# Patient Record
Sex: FEMALE | Race: WHITE | NOT HISPANIC OR LATINO | URBAN - METROPOLITAN AREA
[De-identification: names, ages, dates, MRNs, and addresses within clinical notes are randomized per-mention and may not be internally consistent; named-entity substitution may affect disease eponyms.]

---

## 2018-10-25 ENCOUNTER — INPATIENT (INPATIENT)
Facility: HOSPITAL | Age: 23
LOS: 0 days | Discharge: HOME | DRG: 761 | End: 2018-10-25
Attending: OBSTETRICS & GYNECOLOGY | Admitting: OBSTETRICS & GYNECOLOGY
Payer: COMMERCIAL

## 2018-10-25 ENCOUNTER — TRANSCRIPTION ENCOUNTER (OUTPATIENT)
Age: 23
End: 2018-10-25

## 2018-10-25 VITALS
TEMPERATURE: 99 F | HEIGHT: 64 IN | DIASTOLIC BLOOD PRESSURE: 82 MMHG | HEART RATE: 101 BPM | OXYGEN SATURATION: 97 % | SYSTOLIC BLOOD PRESSURE: 129 MMHG | RESPIRATION RATE: 18 BRPM | WEIGHT: 119.93 LBS

## 2018-10-25 VITALS
DIASTOLIC BLOOD PRESSURE: 61 MMHG | SYSTOLIC BLOOD PRESSURE: 123 MMHG | RESPIRATION RATE: 18 BRPM | HEART RATE: 783 BPM | OXYGEN SATURATION: 100 % | TEMPERATURE: 98 F

## 2018-10-25 DIAGNOSIS — N83.209 UNSPECIFIED OVARIAN CYST, UNSPECIFIED SIDE: ICD-10-CM

## 2018-10-25 LAB
ALBUMIN SERPL ELPH-MCNC: 4.5 G/DL — SIGNIFICANT CHANGE UP (ref 3.5–5)
ALP SERPL-CCNC: 82 U/L — SIGNIFICANT CHANGE UP (ref 40–120)
ALT FLD-CCNC: 58 U/L DA — SIGNIFICANT CHANGE UP (ref 10–60)
ANION GAP SERPL CALC-SCNC: 7 MMOL/L — SIGNIFICANT CHANGE UP (ref 5–17)
APPEARANCE UR: CLEAR — SIGNIFICANT CHANGE UP
APTT BLD: 31.5 SEC — SIGNIFICANT CHANGE UP (ref 27.5–37.4)
AST SERPL-CCNC: 30 U/L — SIGNIFICANT CHANGE UP (ref 10–40)
BASOPHILS # BLD AUTO: 0.1 K/UL — SIGNIFICANT CHANGE UP (ref 0–0.2)
BASOPHILS NFR BLD AUTO: 0.5 % — SIGNIFICANT CHANGE UP (ref 0–2)
BASOPHILS NFR BLD AUTO: 0.6 % — SIGNIFICANT CHANGE UP (ref 0–2)
BASOPHILS NFR BLD AUTO: 0.7 % — SIGNIFICANT CHANGE UP (ref 0–2)
BILIRUB SERPL-MCNC: 0.4 MG/DL — SIGNIFICANT CHANGE UP (ref 0.2–1.2)
BILIRUB UR-MCNC: NEGATIVE — SIGNIFICANT CHANGE UP
BUN SERPL-MCNC: 16 MG/DL — SIGNIFICANT CHANGE UP (ref 7–18)
CALCIUM SERPL-MCNC: 9.7 MG/DL — SIGNIFICANT CHANGE UP (ref 8.4–10.5)
CHLORIDE SERPL-SCNC: 106 MMOL/L — SIGNIFICANT CHANGE UP (ref 96–108)
CO2 SERPL-SCNC: 26 MMOL/L — SIGNIFICANT CHANGE UP (ref 22–31)
COLOR SPEC: YELLOW — SIGNIFICANT CHANGE UP
CREAT SERPL-MCNC: 0.85 MG/DL — SIGNIFICANT CHANGE UP (ref 0.5–1.3)
DIFF PNL FLD: ABNORMAL
EOSINOPHIL # BLD AUTO: 0 K/UL — SIGNIFICANT CHANGE UP (ref 0–0.5)
EOSINOPHIL # BLD AUTO: 0 K/UL — SIGNIFICANT CHANGE UP (ref 0–0.5)
EOSINOPHIL # BLD AUTO: 0.1 K/UL — SIGNIFICANT CHANGE UP (ref 0–0.5)
EOSINOPHIL NFR BLD AUTO: 0.2 % — SIGNIFICANT CHANGE UP (ref 0–6)
EOSINOPHIL NFR BLD AUTO: 0.4 % — SIGNIFICANT CHANGE UP (ref 0–6)
EOSINOPHIL NFR BLD AUTO: 0.5 % — SIGNIFICANT CHANGE UP (ref 0–6)
GLUCOSE SERPL-MCNC: 88 MG/DL — SIGNIFICANT CHANGE UP (ref 70–99)
GLUCOSE UR QL: NEGATIVE — SIGNIFICANT CHANGE UP
HCG SERPL-ACNC: <1 MIU/ML — SIGNIFICANT CHANGE UP
HCG UR QL: NEGATIVE — SIGNIFICANT CHANGE UP
HCT VFR BLD CALC: 28.8 % — LOW (ref 34.5–45)
HCT VFR BLD CALC: 28.9 % — LOW (ref 34.5–45)
HCT VFR BLD CALC: 30.4 % — LOW (ref 34.5–45)
HCT VFR BLD CALC: 37 % — SIGNIFICANT CHANGE UP (ref 34.5–45)
HGB BLD-MCNC: 10.1 G/DL — LOW (ref 11.5–15.5)
HGB BLD-MCNC: 12.3 G/DL — SIGNIFICANT CHANGE UP (ref 11.5–15.5)
HGB BLD-MCNC: 9.4 G/DL — LOW (ref 11.5–15.5)
HGB BLD-MCNC: 9.5 G/DL — LOW (ref 11.5–15.5)
INR BLD: 1.13 RATIO — SIGNIFICANT CHANGE UP (ref 0.88–1.16)
KETONES UR-MCNC: ABNORMAL
LEUKOCYTE ESTERASE UR-ACNC: ABNORMAL
LIDOCAIN IGE QN: 286 U/L — SIGNIFICANT CHANGE UP (ref 73–393)
LYMPHOCYTES # BLD AUTO: 2.5 K/UL — SIGNIFICANT CHANGE UP (ref 1–3.3)
LYMPHOCYTES # BLD AUTO: 2.8 K/UL — SIGNIFICANT CHANGE UP (ref 1–3.3)
LYMPHOCYTES # BLD AUTO: 24.6 % — SIGNIFICANT CHANGE UP (ref 13–44)
LYMPHOCYTES # BLD AUTO: 28 % — SIGNIFICANT CHANGE UP (ref 13–44)
LYMPHOCYTES # BLD AUTO: 28.8 % — SIGNIFICANT CHANGE UP (ref 13–44)
LYMPHOCYTES # BLD AUTO: 3.1 K/UL — SIGNIFICANT CHANGE UP (ref 1–3.3)
MCHC RBC-ENTMCNC: 27.6 PG — SIGNIFICANT CHANGE UP (ref 27–34)
MCHC RBC-ENTMCNC: 28 PG — SIGNIFICANT CHANGE UP (ref 27–34)
MCHC RBC-ENTMCNC: 28 PG — SIGNIFICANT CHANGE UP (ref 27–34)
MCHC RBC-ENTMCNC: 28.2 PG — SIGNIFICANT CHANGE UP (ref 27–34)
MCHC RBC-ENTMCNC: 32.6 GM/DL — SIGNIFICANT CHANGE UP (ref 32–36)
MCHC RBC-ENTMCNC: 33.1 GM/DL — SIGNIFICANT CHANGE UP (ref 32–36)
MCHC RBC-ENTMCNC: 33.2 GM/DL — SIGNIFICANT CHANGE UP (ref 32–36)
MCHC RBC-ENTMCNC: 33.3 GM/DL — SIGNIFICANT CHANGE UP (ref 32–36)
MCV RBC AUTO: 84.4 FL — SIGNIFICANT CHANGE UP (ref 80–100)
MCV RBC AUTO: 84.6 FL — SIGNIFICANT CHANGE UP (ref 80–100)
MCV RBC AUTO: 84.6 FL — SIGNIFICANT CHANGE UP (ref 80–100)
MCV RBC AUTO: 84.9 FL — SIGNIFICANT CHANGE UP (ref 80–100)
MONOCYTES # BLD AUTO: 0.7 K/UL — SIGNIFICANT CHANGE UP (ref 0–0.9)
MONOCYTES # BLD AUTO: 0.8 K/UL — SIGNIFICANT CHANGE UP (ref 0–0.9)
MONOCYTES # BLD AUTO: 1 K/UL — HIGH (ref 0–0.9)
MONOCYTES NFR BLD AUTO: 7.5 % — SIGNIFICANT CHANGE UP (ref 2–14)
MONOCYTES NFR BLD AUTO: 7.9 % — SIGNIFICANT CHANGE UP (ref 2–14)
MONOCYTES NFR BLD AUTO: 7.9 % — SIGNIFICANT CHANGE UP (ref 2–14)
NEUTROPHILS # BLD AUTO: 5.5 K/UL — SIGNIFICANT CHANGE UP (ref 1.8–7.4)
NEUTROPHILS # BLD AUTO: 6.2 K/UL — SIGNIFICANT CHANGE UP (ref 1.8–7.4)
NEUTROPHILS # BLD AUTO: 8.5 K/UL — HIGH (ref 1.8–7.4)
NEUTROPHILS NFR BLD AUTO: 62 % — SIGNIFICANT CHANGE UP (ref 43–77)
NEUTROPHILS NFR BLD AUTO: 63.4 % — SIGNIFICANT CHANGE UP (ref 43–77)
NEUTROPHILS NFR BLD AUTO: 66.8 % — SIGNIFICANT CHANGE UP (ref 43–77)
NITRITE UR-MCNC: NEGATIVE — SIGNIFICANT CHANGE UP
PH UR: 5 — SIGNIFICANT CHANGE UP (ref 5–8)
PLATELET # BLD AUTO: 197 K/UL — SIGNIFICANT CHANGE UP (ref 150–400)
PLATELET # BLD AUTO: 216 K/UL — SIGNIFICANT CHANGE UP (ref 150–400)
PLATELET # BLD AUTO: 224 K/UL — SIGNIFICANT CHANGE UP (ref 150–400)
PLATELET # BLD AUTO: 275 K/UL — SIGNIFICANT CHANGE UP (ref 150–400)
POTASSIUM SERPL-MCNC: 3.6 MMOL/L — SIGNIFICANT CHANGE UP (ref 3.5–5.3)
POTASSIUM SERPL-SCNC: 3.6 MMOL/L — SIGNIFICANT CHANGE UP (ref 3.5–5.3)
PROT SERPL-MCNC: 8.2 G/DL — SIGNIFICANT CHANGE UP (ref 6–8.3)
PROT UR-MCNC: NEGATIVE — SIGNIFICANT CHANGE UP
PROTHROM AB SERPL-ACNC: 12.4 SEC — SIGNIFICANT CHANGE UP (ref 9.8–12.7)
RBC # BLD: 3.4 M/UL — LOW (ref 3.8–5.2)
RBC # BLD: 3.41 M/UL — LOW (ref 3.8–5.2)
RBC # BLD: 3.57 M/UL — LOW (ref 3.8–5.2)
RBC # BLD: 4.39 M/UL — SIGNIFICANT CHANGE UP (ref 3.8–5.2)
RBC # FLD: 11.3 % — SIGNIFICANT CHANGE UP (ref 10.3–14.5)
RBC # FLD: 11.5 % — SIGNIFICANT CHANGE UP (ref 10.3–14.5)
SODIUM SERPL-SCNC: 139 MMOL/L — SIGNIFICANT CHANGE UP (ref 135–145)
SP GR SPEC: 1.02 — SIGNIFICANT CHANGE UP (ref 1.01–1.02)
UROBILINOGEN FLD QL: NEGATIVE — SIGNIFICANT CHANGE UP
WBC # BLD: 12.6 K/UL — HIGH (ref 3.8–10.5)
WBC # BLD: 14.1 K/UL — HIGH (ref 3.8–10.5)
WBC # BLD: 8.8 K/UL — SIGNIFICANT CHANGE UP (ref 3.8–10.5)
WBC # BLD: 9.6 K/UL — SIGNIFICANT CHANGE UP (ref 3.8–10.5)
WBC # FLD AUTO: 12.6 K/UL — HIGH (ref 3.8–10.5)
WBC # FLD AUTO: 14.1 K/UL — HIGH (ref 3.8–10.5)
WBC # FLD AUTO: 8.8 K/UL — SIGNIFICANT CHANGE UP (ref 3.8–10.5)
WBC # FLD AUTO: 9.6 K/UL — SIGNIFICANT CHANGE UP (ref 3.8–10.5)

## 2018-10-25 PROCEDURE — 86900 BLOOD TYPING SEROLOGIC ABO: CPT

## 2018-10-25 PROCEDURE — 85027 COMPLETE CBC AUTOMATED: CPT

## 2018-10-25 PROCEDURE — 80053 COMPREHEN METABOLIC PANEL: CPT

## 2018-10-25 PROCEDURE — 83690 ASSAY OF LIPASE: CPT

## 2018-10-25 PROCEDURE — 76856 US EXAM PELVIC COMPLETE: CPT

## 2018-10-25 PROCEDURE — 76830 TRANSVAGINAL US NON-OB: CPT | Mod: 26

## 2018-10-25 PROCEDURE — 85610 PROTHROMBIN TIME: CPT

## 2018-10-25 PROCEDURE — 84702 CHORIONIC GONADOTROPIN TEST: CPT

## 2018-10-25 PROCEDURE — 85730 THROMBOPLASTIN TIME PARTIAL: CPT

## 2018-10-25 PROCEDURE — 86850 RBC ANTIBODY SCREEN: CPT

## 2018-10-25 PROCEDURE — 86901 BLOOD TYPING SEROLOGIC RH(D): CPT

## 2018-10-25 PROCEDURE — 76856 US EXAM PELVIC COMPLETE: CPT | Mod: 26

## 2018-10-25 PROCEDURE — 81025 URINE PREGNANCY TEST: CPT

## 2018-10-25 PROCEDURE — 76830 TRANSVAGINAL US NON-OB: CPT

## 2018-10-25 PROCEDURE — 96375 TX/PRO/DX INJ NEW DRUG ADDON: CPT

## 2018-10-25 PROCEDURE — 99285 EMERGENCY DEPT VISIT HI MDM: CPT | Mod: 25

## 2018-10-25 PROCEDURE — 96374 THER/PROPH/DIAG INJ IV PUSH: CPT

## 2018-10-25 PROCEDURE — 81001 URINALYSIS AUTO W/SCOPE: CPT

## 2018-10-25 PROCEDURE — 87086 URINE CULTURE/COLONY COUNT: CPT

## 2018-10-25 RX ORDER — KETOROLAC TROMETHAMINE 30 MG/ML
30 SYRINGE (ML) INJECTION ONCE
Qty: 0 | Refills: 0 | Status: DISCONTINUED | OUTPATIENT
Start: 2018-10-25 | End: 2018-10-25

## 2018-10-25 RX ORDER — KETOROLAC TROMETHAMINE 30 MG/ML
15 SYRINGE (ML) INJECTION EVERY 6 HOURS
Qty: 0 | Refills: 0 | Status: DISCONTINUED | OUTPATIENT
Start: 2018-10-25 | End: 2018-10-25

## 2018-10-25 RX ORDER — ACETAMINOPHEN 500 MG
1 TABLET ORAL
Qty: 20 | Refills: 0 | OUTPATIENT
Start: 2018-10-25 | End: 2018-10-29

## 2018-10-25 RX ORDER — SODIUM CHLORIDE 9 MG/ML
1000 INJECTION, SOLUTION INTRAVENOUS
Qty: 0 | Refills: 0 | Status: DISCONTINUED | OUTPATIENT
Start: 2018-10-25 | End: 2018-10-25

## 2018-10-25 RX ORDER — FERROUS SULFATE 325(65) MG
1 TABLET ORAL
Qty: 30 | Refills: 0 | OUTPATIENT
Start: 2018-10-25 | End: 2018-11-23

## 2018-10-25 RX ORDER — ONDANSETRON 8 MG/1
4 TABLET, FILM COATED ORAL ONCE
Qty: 0 | Refills: 0 | Status: COMPLETED | OUTPATIENT
Start: 2018-10-25 | End: 2018-10-25

## 2018-10-25 RX ORDER — SODIUM CHLORIDE 9 MG/ML
1000 INJECTION INTRAMUSCULAR; INTRAVENOUS; SUBCUTANEOUS ONCE
Qty: 0 | Refills: 0 | Status: COMPLETED | OUTPATIENT
Start: 2018-10-25 | End: 2018-10-25

## 2018-10-25 RX ADMIN — Medication 30 MILLIGRAM(S): at 03:17

## 2018-10-25 RX ADMIN — SODIUM CHLORIDE 1000 MILLILITER(S): 9 INJECTION INTRAMUSCULAR; INTRAVENOUS; SUBCUTANEOUS at 03:17

## 2018-10-25 RX ADMIN — ONDANSETRON 4 MILLIGRAM(S): 8 TABLET, FILM COATED ORAL at 02:53

## 2018-10-25 RX ADMIN — SODIUM CHLORIDE 1000 MILLILITER(S): 9 INJECTION INTRAMUSCULAR; INTRAVENOUS; SUBCUTANEOUS at 02:53

## 2018-10-25 RX ADMIN — Medication 30 MILLIGRAM(S): at 04:17

## 2018-10-25 RX ADMIN — SODIUM CHLORIDE 1000 MILLILITER(S): 9 INJECTION INTRAMUSCULAR; INTRAVENOUS; SUBCUTANEOUS at 06:42

## 2018-10-25 NOTE — ED PROVIDER NOTE - PROGRESS NOTE DETAILS
repeat hgb dropped 2 points. obgyn consulted- pt to be admitted to obgyn. tachycardia resolved, but BP now low normal. additional 1L NS ordered.

## 2018-10-25 NOTE — DISCHARGE NOTE ADULT - MEDICATION SUMMARY - MEDICATIONS TO TAKE
I will START or STAY ON the medications listed below when I get home from the hospital:    acetaminophen 325 mg oral tablet  -- 1 tab(s) by mouth every 6 hours, As Needed   -- This product contains acetaminophen.  Do not use  with any other product containing acetaminophen to prevent possible liver damage.    -- Indication: For pain I will START or STAY ON the medications listed below when I get home from the hospital:    acetaminophen 325 mg oral tablet  -- 1 tab(s) by mouth every 6 hours, As Needed   -- This product contains acetaminophen.  Do not use  with any other product containing acetaminophen to prevent possible liver damage.    -- Indication: For pain     ferrous sulfate 325 mg (65 mg elemental iron) oral tablet  -- 1 tab(s) by mouth once a day   -- Check with your doctor before becoming pregnant.  Do not chew, break, or crush.  May discolor urine or feces.    -- Indication: For anemia due to blood loss

## 2018-10-25 NOTE — H&P ADULT - HISTORY OF PRESENT ILLNESS
Patiens is a 22y/o F  (LMP ) who presents to ER complaining of sudden onset pelvic pain after intercourse last night around midnight. States that the pain was sharp and sudden and could barely walk. Denies any n/v/d, fever, chills, loss of appetite, weakness, dizziness, sob, chest pain or any other concerns.  Denies any ob/gyn hx, has never seen a gynecologist

## 2018-10-25 NOTE — H&P ADULT - PROBLEM SELECTOR PLAN 1
At this time, per Dr. Gregory, will manage conservatively, will repeat serial cbcs every 4 hours, to ensure patient is stable  monitor for 24 hours since first cbc  patient to be referred to the [facility] upon discharge   keep patient NPO for possible need for OR

## 2018-10-25 NOTE — PROGRESS NOTE ADULT - ASSESSMENT
A/P:  22yo F admitted to GYN with LT ruptured hemorrhagic cyst. Hemodynamically stable. Hgb from 12 to 9.5 over 7hours with IV hydration.

## 2018-10-25 NOTE — ED ADULT NURSE NOTE - ED STAT RN HANDOFF DETAILS 2
Received patient from [facility] A&OX3 admitted to surgery denies any pain or discomfort at present time. Patient assigned bed report to be given.

## 2018-10-25 NOTE — ED PROVIDER NOTE - MEDICAL DECISION MAKING DETAILS
22 y/o F pt presents with RLQ and suprapubic tenderness to palpation with mild tenderness to palpation to the LLQ. Will order labs, US pelvis, pain meds, UA and reassess. 24 y/o F pt presents with RLQ and suprapubic tenderness to palpation with mild tenderness to palpation to the LLQ. Will order labs, US pelvis, pain meds, UA and reassess.  labs initially unremarkable. upreg negative. US shows +FF and left ovarian cyst. rpt labs with drop In hgb. obgyn consulted- will admit.

## 2018-10-25 NOTE — PROGRESS NOTE ADULT - PROBLEM SELECTOR PLAN 1
- Continue close monitoring of vital signs  - Repeat CBC in 4 hours (1pm)  - Pain management PRN.  - Continue IV hydration  - Keep NPO   - Will reassess this PM  - Patient seen and examined with louie Walsh

## 2018-10-25 NOTE — DISCHARGE NOTE ADULT - PATIENT PORTAL LINK FT
You can access the via680[facility] Patient Portal, offered by [facility], by registering with the following website: [email or web address]

## 2018-10-25 NOTE — DISCHARGE NOTE ADULT - HOSPITAL COURSE
patient presented to ED with abdominal pain  Found to heave a hemorrhagic cyst on sono  H/H droped from 12 to 10 and patient was admitted  Repeat hgb was stable, patient clinically improved  Deemed hemodynamically stable by Dr. Rangel for discharge home

## 2018-10-25 NOTE — DISCHARGE NOTE ADULT - CARE PLAN
Principal Discharge DX:	Ruptured ovarian cyst  Goal:	pain management  Assessment and plan of treatment:	Take Tylenol as needed for pain  You should follow up with Dr. Rangel in [facility] on a WEDNESDAY in 2 weeks. Please call to make an appointment (number provided)  Take Iron once per day for the next 2 weeks to help replenish your iron stores  If you develop any worsening pain, dizziness, lightheadedness, chest pain, SOB, or fainting, please return to the ER immediately for medical attention. Principal Discharge DX:	Ruptured ovarian cyst  Goal:	pain management  Assessment and plan of treatment:	Take Tylenol as needed for pain  Take Iron once per day for the next 30 days for anemia due to blood loss.   You should follow up with Dr. Rangel in [facility] on a WEDNESDAY in 2 weeks. Please call to make an appointment (number provided)  If you develop any worsening pain, dizziness, lightheadedness, chest pain, SOB, or fainting, please return to the ER immediately for medical attention.

## 2018-10-25 NOTE — DISCHARGE NOTE ADULT - CARE PROVIDER_API CALL
Ximena Rangel), Obstetrics and Gynecology  [address] [place] [address]  Phone: [phone]  Fax: [phone]

## 2018-10-25 NOTE — H&P ADULT - NSHPPHYSICALEXAM_GEN_ALL_CORE
Gen: NAD, A&Ox3   Abd: Right lower quadrant more tender than the left lower quadrant, +rebound, no guarding;  gyn: no vaginal bleeding, no discharge

## 2018-10-25 NOTE — PROGRESS NOTE ADULT - SUBJECTIVE AND OBJECTIVE BOX
[facility], states her pain is much improved from earlier. Has received no pain medication while on the floor.     Vital Signs Last 24 Hrs  T(C): 36.7 (25 Oct 2018 09:05), Max: 37.1 (25 Oct 2018 01:22)  T(F): 98 (25 Oct 2018 09:05), Max: 98.8 (25 Oct 2018 01:22)  HR: 78 (25 Oct 2018 09:05) (78 - 101)  BP: 100/50 (25 Oct 2018 09:05) (95/53 - 129/82)  BP(mean): --  RR: 18 (25 Oct 2018 09:05) (16 - 18)  SpO2: 100% (25 Oct 2018 09:05) (97% - 100%)    Gen: A&O x 3, NAD  Abdomen: very mild discomfort to deep palpation through abdomen; soft, ND, no guarding or rebound; patient able to sit up in bed on her own without discomfort; no peritoneal signs  Gyn: no vaginal bleeding   Extremities: Nontender, no worsening edema                          9.5    x     )-----------( 216      ( 25 Oct 2018 10:31 )             28.8     10-25    139  |  106  |  16  ----------------------------<  88  3.6   |  26  |  0.85    Ca    9.7      25 Oct 2018 02:54    TPro  8.2  /  Alb  4.5  /  TBili  0.4  /  DBili  x   /  AST  30  /  ALT  58  /  AlkPhos  82  10-25      A/P:  22yo F admitted to GYN with LT ruptured hemorrhagic cyst. Hemodynamically stable. Hgb from 12 to 9.5 over 7hours with IV hydration.  - Continue close monitoring of vital signs  - Repeat CBC in 4 hours (1pm)  - Pain management PRN.  - Continue IV hydration  - Keep NPO   - Will reassess this PM  - Patient seen and examined with louie Walsh attending

## 2018-10-25 NOTE — H&P ADULT - NSHPLABSRESULTS_GEN_ALL_CORE
10.1   14.1  )-----------( 224      ( 25 Oct 2018 05:18 )             30.4     10-25    139  |  106  |  16  ----------------------------<  88  3.6   |  26  |  0.85    Ca    9.7      25 Oct 2018 02:54    TPro  8.2  /  Alb  4.5  /  TBili  0.4  /  DBili  x   /  AST  30  /  ALT  58  /  AlkPhos  82  10-25    Type + Screen (10.25.18 @ 03:09)    ABO RH Interpretation: B NEG: 10/25/2018 4:07  JRACOL  Attention: Second specimen is required for ABORH Confirmation.    < from: US Transvaginal (10.25.18 @ 03:45) >      EXAM:  US PELVIC COMPLETE                          EXAM:  US TRANSVAGINAL                            PROCEDURE DATE:  10/25/2018          INTERPRETATION:  10/25/2018 3:46 AM    CLINICAL INFORMATION: Right-sided pelvic pain    LMP: 9/27/2018, hCG less than 1    COMPARISON: None available.    TECHNIQUE: Transabdominal and Endovaginal pelvic sonogram. Color and   Spectral Doppler was performed.    FINDINGS:    Uterus: 10.4 x 3.8 x 5.4 cm.   Endometrium: 4 mm.   Right ovary: 3.5 x 2.0 x 2.0 cm. Flow is preserved  Left ovary: 3.6 x 2.7 x 3.8 cm. Complex cyst measuring 3.0 x 2.9 x 2.2   cm. Flow is preserved  Free fluid: Mild complex free fluid along the pelvis. Small amount of   fluid extends to the right upper quadrant along Germain's pouch.    IMPRESSION:    Complex cyst along the left ovary likely represents a hemorrhagic cyst.   Flow is preserved.    There is mild complex fluid within the pelvis and right upper quadrant   which likely indicates cyst rupture and resulting mild hemoperitoneum.    < end of copied text >

## 2018-10-25 NOTE — ED ADULT NURSE NOTE - OBJECTIVE STATEMENT
Pt c/o of lower abdominal 6/10, says it started last night, c/o of a little bit of nausea but no diarrhea.  Pt is A&O x3, able to make needs known, ambulates on own. Family at bedside.

## 2018-10-25 NOTE — CHART NOTE - NSCHARTNOTEFT_GEN_A_CORE
Repeat H/H 9.4/28.9, stable from previous CBC  Patient reassessed   States she is feeling much better   Asking when she can be discharged home  Patient deemed hemodynamically stable for discharge by Dr. Rangel, [place] attending   Tylenol PRN for pain  Please follow up in [facility] with Dr. Rangel on a Wednesday, in 2 weeks  Patient understands and agrees with plan   D/w Dr. Rangel

## 2018-10-25 NOTE — DISCHARGE NOTE ADULT - PLAN OF CARE
pain management Take Tylenol as needed for pain  You should follow up with Dr. Rangel in [facility] on a WEDNESDAY in 2 weeks. Please call to make an appointment (number provided)  Take Iron once per day for the next 2 weeks to help replenish your iron stores  If you develop any worsening pain, dizziness, lightheadedness, chest pain, SOB, or fainting, please return to the ER immediately for medical attention. Take Tylenol as needed for pain  Take Iron once per day for the next 30 days for anemia due to blood loss.   You should follow up with Dr. Rangel in [facility] on a WEDNESDAY in 2 weeks. Please call to make an appointment (number provided)  If you develop any worsening pain, dizziness, lightheadedness, chest pain, SOB, or fainting, please return to the ER immediately for medical attention.

## 2018-10-25 NOTE — ED ADULT NURSE REASSESSMENT NOTE - NS ED NURSE REASSESS COMMENT FT1
Pt reassessed, observed laying in bed, breathing room air, in no respiratory distress at time of assessment. All meds administered as ordered, tolerated well. Admitted to OBGYN, awaiting bed, nursing monitoring continues.

## 2018-10-25 NOTE — ED PROVIDER NOTE - OBJECTIVE STATEMENT
24 y/o F pt with no significant PMHx and no significant PSHx presents to the ED c/o sudden onset of lower abd/suprapubic pain with lower back pain while sitting x 30 minutes PTA to the ED. Pt also reports 1 episode of vomiting PTA to the ED with current nausea. Pt notes she has never has pain like this before and has not taken any medications PTA to the ED. Pt denies fever, chills, burning urination, hematuria, vaginal bleeding, diarrhea, Hx of abd surgeries, Hx of ovarian cysts or fibroids or any other complaints. NKDA. LNMP: 1 month ago.

## 2018-10-26 LAB
CULTURE RESULTS: NO GROWTH — SIGNIFICANT CHANGE UP
SPECIMEN SOURCE: SIGNIFICANT CHANGE UP

## 2018-11-16 ENCOUNTER — APPOINTMENT (OUTPATIENT)
Age: 23
End: 2018-11-16
Payer: COMMERCIAL

## 2018-11-16 VITALS
SYSTOLIC BLOOD PRESSURE: 110 MMHG | BODY MASS INDEX: 21.51 KG/M2 | HEART RATE: 83 BPM | TEMPERATURE: 98 F | OXYGEN SATURATION: 98 % | WEIGHT: 126 LBS | DIASTOLIC BLOOD PRESSURE: 60 MMHG | HEIGHT: 64 IN

## 2018-11-16 DIAGNOSIS — N83.202 UNSPECIFIED OVARIAN CYST, LEFT SIDE: ICD-10-CM

## 2018-11-16 DIAGNOSIS — Z87.440 PERSONAL HISTORY OF URINARY (TRACT) INFECTIONS: ICD-10-CM

## 2018-11-16 DIAGNOSIS — Z01.419 ENCOUNTER FOR GYNECOLOGICAL EXAMINATION (GENERAL) (ROUTINE) W/OUT ABNORMAL FINDINGS: ICD-10-CM

## 2018-11-16 DIAGNOSIS — Z30.011 ENCOUNTER FOR INITIAL PRESCRIPTION OF CONTRACEPTIVE PILLS: ICD-10-CM

## 2018-11-16 DIAGNOSIS — Z30.09 ENCOUNTER FOR OTHER GENERAL COUNSELING AND ADVICE ON CONTRACEPTION: ICD-10-CM

## 2018-11-16 DIAGNOSIS — Z11.3 ENCOUNTER FOR SCREENING FOR INFECTIONS WITH A PREDOMINANTLY SEXUAL MODE OF TRANSMISSION: ICD-10-CM

## 2018-11-16 DIAGNOSIS — N83.209 UNSPECIFIED OVARIAN CYST, UNSPECIFIED SIDE: ICD-10-CM

## 2018-11-16 PROBLEM — Z00.00 ENCOUNTER FOR PREVENTIVE HEALTH EXAMINATION: Status: ACTIVE | Noted: 2018-11-16

## 2018-11-16 PROCEDURE — 99385 PREV VISIT NEW AGE 18-39: CPT

## 2018-11-19 LAB
C TRACH RRNA SPEC QL NAA+PROBE: NOT DETECTED
N GONORRHOEA RRNA SPEC QL NAA+PROBE: NOT DETECTED
SOURCE TP AMPLIFICATION: NORMAL

## 2018-11-20 LAB — CYTOLOGY CVX/VAG DOC THIN PREP: NORMAL

## 2019-04-12 ENCOUNTER — EMERGENCY (EMERGENCY)
Facility: HOSPITAL | Age: 24
LOS: 1 days | Discharge: HOME | End: 2019-04-12
Attending: EMERGENCY MEDICINE
Payer: COMMERCIAL

## 2019-04-12 VITALS
HEART RATE: 78 BPM | RESPIRATION RATE: 16 BRPM | DIASTOLIC BLOOD PRESSURE: 74 MMHG | OXYGEN SATURATION: 100 % | SYSTOLIC BLOOD PRESSURE: 110 MMHG | HEIGHT: 64 IN | TEMPERATURE: 98 F | WEIGHT: 119.93 LBS

## 2019-04-12 PROCEDURE — 99282 EMERGENCY DEPT VISIT SF MDM: CPT

## 2019-04-12 PROCEDURE — 99283 EMERGENCY DEPT VISIT LOW MDM: CPT

## 2019-04-12 RX ORDER — SODIUM CHLORIDE 9 MG/ML
1000 INJECTION INTRAMUSCULAR; INTRAVENOUS; SUBCUTANEOUS ONCE
Qty: 0 | Refills: 0 | Status: DISCONTINUED | OUTPATIENT
Start: 2019-04-12 | End: 2019-04-12

## 2019-04-12 NOTE — ED PROVIDER NOTE - CLINICAL SUMMARY MEDICAL DECISION MAKING FREE TEXT BOX
24 y/o F presents with pelvic pain. Will obtain labs and pelvic ultrasound to evaluate for possible cyst. Reassess.

## 2019-04-12 NOTE — ED PROVIDER NOTE - PROGRESS NOTE DETAILS
Symptoms improved in the ED. Patient does not wish to wait for imaging as has ultrasound scheduled in the next week. Patient wishes to be discharged.

## 2019-04-12 NOTE — ED ADULT NURSE NOTE - OBJECTIVE STATEMENT
23yr old F c/o of LLQ pain, denies n/v, pt stated symptoms started in November 2018, pt vital signs stable , independent

## 2019-04-12 NOTE — ED PROVIDER NOTE - OBJECTIVE STATEMENT
24 y/o F with PMHx of ovarian cyst presents to the ED with complaints of L pelvic pain this morning. Patient states pain was severe for which she took Motrin. Patient states upon arrival to the ED, pain resolved. Denies any other acute complaints.

## 2019-04-12 NOTE — ED ADULT TRIAGE NOTE - CHIEF COMPLAINT QUOTE
LLQ Abd pain since am ,similar symptom october last year and was diagnosed with ruptured ovarian cyst

## 2023-02-08 NOTE — ED PROVIDER NOTE - CPE EDP SKIN NORM
I believe the syringes need to be sent to the pharmacy as well for the methotrexate injections. Please check if these are the correct gauges. normal...

## 2023-03-29 PROBLEM — N83.209 UNSPECIFIED OVARIAN CYST, UNSPECIFIED SIDE: Chronic | Status: ACTIVE | Noted: 2019-04-12

## 2023-04-03 ENCOUNTER — APPOINTMENT (OUTPATIENT)
Dept: OBGYN | Facility: CLINIC | Age: 28
End: 2023-04-03